# Patient Record
Sex: FEMALE | Race: BLACK OR AFRICAN AMERICAN | Employment: OTHER | URBAN - METROPOLITAN AREA
[De-identification: names, ages, dates, MRNs, and addresses within clinical notes are randomized per-mention and may not be internally consistent; named-entity substitution may affect disease eponyms.]

---

## 2017-01-23 ENCOUNTER — TELEPHONE (OUTPATIENT)
Dept: INTERNAL MEDICINE CLINIC | Age: 57
End: 2017-01-23

## 2017-01-23 NOTE — TELEPHONE ENCOUNTER
Attempted to reach patient regarding below. No answer; left message informing pt that prescription cannot be faxed that it has to be picked up in office as it is a controlled substance, and if she has any further questions to contact our office at [phone].

## 2021-08-03 PROBLEM — I10 HIGH BLOOD PRESSURE: Status: RESOLVED | Noted: 2021-08-03 | Resolved: 2021-08-03
